# Patient Record
Sex: FEMALE | Race: ASIAN
[De-identification: names, ages, dates, MRNs, and addresses within clinical notes are randomized per-mention and may not be internally consistent; named-entity substitution may affect disease eponyms.]

---

## 2017-02-07 ENCOUNTER — HOSPITAL ENCOUNTER (EMERGENCY)
Dept: HOSPITAL 21 - SED | Age: 22
LOS: 1 days | Discharge: HOME | End: 2017-02-08
Payer: COMMERCIAL

## 2017-02-07 VITALS — OXYGEN SATURATION: 97 % | SYSTOLIC BLOOD PRESSURE: 133 MMHG | HEART RATE: 125 BPM | DIASTOLIC BLOOD PRESSURE: 78 MMHG

## 2017-02-07 VITALS
RESPIRATION RATE: 12 BRPM | OXYGEN SATURATION: 100 % | HEART RATE: 87 BPM | SYSTOLIC BLOOD PRESSURE: 152 MMHG | DIASTOLIC BLOOD PRESSURE: 97 MMHG

## 2017-02-07 VITALS — BODY MASS INDEX: 27.66 KG/M2 | WEIGHT: 150.31 LBS | HEIGHT: 62 IN

## 2017-02-07 DIAGNOSIS — R05: ICD-10-CM

## 2017-02-07 DIAGNOSIS — R50.9: ICD-10-CM

## 2017-02-07 DIAGNOSIS — R10.9: ICD-10-CM

## 2017-02-07 DIAGNOSIS — F17.200: ICD-10-CM

## 2017-02-07 DIAGNOSIS — F15.20: Primary | ICD-10-CM

## 2017-02-07 NOTE — ED.REPORT
HPI-Psychiatric Illness


Date of Service


Feb 7, 2017


 ED Provider: Bonilla Helm MD


Pt is a 22 y/o female w/ a hx of polysubstance abuse, bipolar disorder, anxiety

, PTSD, presenting to the ED via EMS for detox from meth. The pt last used 

earlier today. She denies heroin, prescription sedative, or alcohol use. She 

has vague associated complaints such as fever, cough, abdominal pain. She 

denies dysuria, diarrhea, vomiting, SI or HI. She does not feel safe where she 

lives and does not want to say why. She is a local resident.


Nursing Notes


Stated Complaint:  CONFUSION


Chief Complaint:  Psychiatric Complaint


Nursing Notes Reviewed:  Yes


Allergies:  


Coded Allergies:  


     No Known Allergies (Verified  Allergy, Unknown, 2/7/17)


No Active Prescriptions or Reported Meds





General


Time Seen by MD:  15:08





Chief Complaint


Bizarre behavior


Hx Obtained From:  Patient, EMS


Arrived By:  Ambulance


Onset Occurred:  5 - 8 hours ago


Symptom Duration:  Since onset


Location:  : Abdomen


Quality:  Aching


Severity: Current:  Mild


Severity: Maximum:  Mild





Risk-Psychiatric Illness


Suicide Risk Stratification


RF Statements:  Risk factors reviewed





Past Medical History


Past Medical History





Substance abuse - meth, cocaine, heroine


Bipolar disorder


Anxiety


PTSD





Past Surgical History





Denies





Smoking History


Current Every Day Smoker





Social History


Alcohol Use:  In recovery


Drug Use:  Cocaine, IV drugs, Meth, THC


Other Social History:  Lives with parents, Local resident





Ambulatory Status


Independent





Review of Systems


Constitutional:  Reports: Fever, 


   Denies: Chills


Respiratory:  Denies: Non-productive cough


GI:  Reports: Abdominal pain, 


   Denies: Diarrhea, Nausea, Vomiting


Psychiatric:  Reports: Change mental status, 


   Denies: Homicidal ideation, Suicidal ideation


Complete sys rev & neg:  except as marked.


 Female:  Denies: Dysuria





Physical Exam


Initial Vital Signs





 Vital Signs (First)








  Date Time  Temp Pulse Resp B/P Pulse Ox O2 Delivery O2 Flow Rate FiO2


 


2/7/17 13:27 36.8 87 12 152/97 100   


 


2/7/17 18:40      Room Air  








Initial VS:  Reviewed, Vital signs normal


    Head / Eyes:  Atraumatic, Normocephalic, PERRL


    ENT:  Mucous membranes moist, Conjunctiva normal, No scleral icterus


    Neck:  Supple, Full range of motion


    Respiratory:  Breath sounds normal, Clear to auscultation, No respiratory 

distress


    Cardiovascular:  Regular rate & rhythm, Heart sounds normal, Intact distal 

pulses


    Abdomen / GI:  Soft, Non-tender, No guarding, No rebound, No distention


    Extremities:  Vascular intact, Neuro intact, No swelling, No tenderness


    Skin:  Warm, Dry, No cyanosis


General/Constitutional:  Awake, Alert, No acute distress, Cooperative, Not 

toxic appearing


Neurologic:  Oriented X3, Speech NL, No motor deficits, No sensory deficits


Mental Status:  Positive: Confused (mild)


Psychiatric:  Not suicidal, Not homicidal


Abnormal Mood/Affect:  Positive: Apathetic, Flat affect


Abnormal Thinking / Perception:  Positive: Confused, Insight abnormal





Re-Eval/Medical Decision


Source of Hx:  Old records, EMS


Re-Evaluation/Progress :  


   Time of Eval:  23:08


   Re-Evaluation/Progress Note:  


Patient still quite confused and tangential in her thinking.  She asks to


be released and yet cannot supply no information regarding where she might


go.  I think it is distinctly unsafe to allow her to leave the building


given the subfreezing temperatures outside.  At this time we will offer


her oral Ativan to try to get her to rest through the rest of the


nighttime hours.


Consultation :  


   Consulted With:  


   Call Returned at:  16:01


   Consultant:  Will see patient, Agrees with eval, Agrees with plan


   Note:  


 will see pt and give her resources


Counseled Regarding:  Diagnosis, Lab results, Need for follow-up, When/why to 

return to ED





Discharge & Departure


Impression:  


 Primary Impression:  


 Methamphetamine abuse


Disposition:  Home


Discharge Condition


All VS Reviewed:  Yes


Condition:  Stable


Referrals:  


[facility] (PCP)


Care Transferred to:  


Rodney Black


Care Transferred at:  23:09


Enrico Attestation


Portions of this note were transcribed by Nickolas Pierre. I, Dr. Helm 

personally performed the history, physical exam and medical decision-making; I 

reviewed and confirmed the accuracy of the information in the transcribed note.





Signed by Enrico Lau, 2/7/17 - 1600


copies to:   [facility]








Bonilla Helm MD Feb 7, 2017 15:12


NICKOLAS PIERRE Feb 7, 2017 15:21

## 2017-02-08 VITALS — DIASTOLIC BLOOD PRESSURE: 74 MMHG | SYSTOLIC BLOOD PRESSURE: 122 MMHG | HEART RATE: 130 BPM

## 2017-02-08 VITALS — HEART RATE: 76 BPM | RESPIRATION RATE: 18 BRPM | SYSTOLIC BLOOD PRESSURE: 126 MMHG | DIASTOLIC BLOOD PRESSURE: 70 MMHG

## 2017-02-08 LAB
BASOPHILS % (AUTO): 0.4 % (ref 0–3)
EOSINOPHILS % (AUTO): 0.2 % (ref 0–5)
MCH RBC QN AUTO: 28.5 PG (ref 27–35)
MEAN CORPUSCULAR VOLUME: 85 FL (ref 81–100)
MONOCYTES % (AUTO): 6.1 % (ref 4–12)
NEUTROPHILS NFR BLD AUTO: 67.2 % (ref 40–74)
PLATELET COUNT: 308 BIL/L (ref 150–400)

## 2017-03-07 ENCOUNTER — HOSPITAL ENCOUNTER (EMERGENCY)
Dept: HOSPITAL 21 - SED | Age: 22
Discharge: HOME | End: 2017-03-07
Payer: COMMERCIAL

## 2017-03-07 VITALS
OXYGEN SATURATION: 99 % | HEART RATE: 113 BPM | SYSTOLIC BLOOD PRESSURE: 124 MMHG | RESPIRATION RATE: 16 BRPM | DIASTOLIC BLOOD PRESSURE: 81 MMHG

## 2017-03-07 VITALS
SYSTOLIC BLOOD PRESSURE: 124 MMHG | HEART RATE: 113 BPM | DIASTOLIC BLOOD PRESSURE: 81 MMHG | RESPIRATION RATE: 16 BRPM | OXYGEN SATURATION: 99 %

## 2017-03-07 VITALS
HEART RATE: 103 BPM | SYSTOLIC BLOOD PRESSURE: 152 MMHG | OXYGEN SATURATION: 97 % | RESPIRATION RATE: 20 BRPM | DIASTOLIC BLOOD PRESSURE: 94 MMHG

## 2017-03-07 VITALS — WEIGHT: 140.3 LBS | HEIGHT: 64 IN | BODY MASS INDEX: 23.95 KG/M2

## 2017-03-07 VITALS — RESPIRATION RATE: 17 BRPM | OXYGEN SATURATION: 98 % | HEART RATE: 126 BPM

## 2017-03-07 DIAGNOSIS — R44.1: ICD-10-CM

## 2017-03-07 DIAGNOSIS — F15.10: Primary | ICD-10-CM

## 2017-03-07 DIAGNOSIS — F31.9: ICD-10-CM

## 2017-03-07 DIAGNOSIS — F17.200: ICD-10-CM

## 2017-03-07 NOTE — ED.REPORT
HPI-Psychiatric Illness


Date of Service


Mar 7, 2017


 ED Provider: Rodney Black MD


Patient is a 21 year old female with a history of polysubstance abuse, bipolar 

disorder, and PTSD who is brought to the ED by PD after she knocked on a 

strangers house this morning, stating that she was lost. Per PD report, the 

patient stated that she had been wandering the streets since 11pm last night. 

On initial evaluation in the ED the patient states that she is hallucinating. 

She admits to using methamphetamine "1 hour ago" and knows that this is why she 

is hallucinating. The patient has previously been in inpatient treatment for 

substance abuse, at a facility in [place]. Patient states that she is trying to 

get into treatment again and that [facility] is coordinating her care. 

Patient lives with her in-laws but states she does not want them to know where 

she is. The patient's last four ED visits have all been related to 

methamphetamine abuse.


Nursing Notes


Stated Complaint:  VOLUNTARY MENTAL HEALTH EVAL


Chief Complaint:  Psychiatric Complaint


Nursing Notes Reviewed:  Yes


Allergies:  


Coded Allergies:  


     No Known Allergies (Verified  Allergy, Unknown, 2/7/17)


No Active Prescriptions or Reported Meds





General


Time Seen by MD:  05:08





Chief Complaint


Bizarre behavior


Hx Obtained From:  Patient, Police


Arrived By:  Walk-in


Onset Occurred:  5 - 8 hours ago


Symptom Duration:  Since onset


Recent Healthcare:  Recent doctor visit


Similar Sx Previous:  Yes





Risk-Psychiatric Illness


Suicide Risk Stratification


RF Statements:  Risk factors N/A





Past Medical History


Past Medical History





Substance abuse - meth, cocaine, heroine


Bipolar disorder


Anxiety


PTSD





Past Surgical History





Denies





Smoking History


Current Every Day Smoker





Social History


Alcohol Use:  In recovery


Drug Use:  Cocaine, IV drugs, Meth, THC


Other Social History:  Lives with parents, Local resident





Ambulatory Status


Independent





Review of Systems


Constitutional:  Denies: Chills, Fever


Psychiatric:  Reports: Hallucinations, visual, 


   Denies: Suicidal ideation


Complete sys rev & neg:  except as marked.





Physical Exam


Initial Vital Signs





 Vital Signs (First)








  Date Time  Temp Pulse Resp B/P Pulse Ox O2 Delivery O2 Flow Rate FiO2


 


3/7/17 03:04 35.6 126 17  98 Room Air  


 


3/7/17 03:46    152/94    








Initial VS:  Reviewed, Vital signs abnormal


    Head / Eyes:  Atraumatic, Normocephalic, PERRL


    Neck:  Supple, Full range of motion


    Extremities:  Vascular intact, Neuro intact


    Skin:  Warm, Dry, No cyanosis


General/Constitutional:  Awake, Alert, No acute distress


Neurologic:  Oriented X3, Speech NL, No motor deficits, No sensory deficits


Psychiatric:  Affect NL, Mood NL


Abnormal Thinking / Perception:  Positive: Hallucinations, visual (obviously 

tending to hallucinations)


ENT:  Airway patent





clenching teeth


Respiratory / Chest:  Breath sounds NL, Breath sounds = bilat, No respiratory 

distress, No rales, No rhonchi, No wheezing


Cardiovascular:  Heart rate NL, Regular rhythm, Heart sounds NL, No murmurs





Re-Eval/Medical Decision


Med Decision/Clinical Course


21-year-old female has a history of methamphetamine abuse.  She was found 

wandering in the cold knocking on random doors.  She was brought here for 

further evaluation and for protection.  Her evaluation was initiated by me.  

Her care is being turned over at change of shift to Dr. Randolph.


Source of Hx:  Old records


Counseled Regarding:  Diagnosis





Discharge & Departure


Shift Change Sign-Out


Patient Care Transferred:  Yes


Discussed Complaint(s):  Yes


Additonal Information:  


Awaiting MSW


Impression:  


 Primary Impression:  


 Methamphetamine abuse


 Additional Impression:  


 Visual hallucinations


Referrals:  


[facility] (PCP)


Care Transferred to:  


Dr. Randolph


Care Transferred at:  06:00


Enrico Attestation


Portions of this note were transcribed by Camille Osman. I, Dr. Black 

personally performed the history, physical exam and medical decision-making; I 

reviewed and confirmed the accuracy of the information in the transcribed note.


Signed by: Enrico Walker, 03/07/2017 0559


copies to:   [facility]








Rodney Black MD Mar 7, 2017 05:36


Camille Osman Mar 7, 2017 05:42

## 2017-06-01 ENCOUNTER — HOSPITAL ENCOUNTER (OUTPATIENT)
Dept: HOSPITAL 76 - EMS | Age: 22
Discharge: ANOTHER HOSPITAL | End: 2017-06-01
Attending: SURGERY
Payer: MEDICAID

## 2017-06-01 ENCOUNTER — HOSPITAL ENCOUNTER (EMERGENCY)
Dept: HOSPITAL 76 - ED | Age: 22
LOS: 1 days | Discharge: HOME | End: 2017-06-02
Payer: MEDICAID

## 2017-06-01 VITALS — SYSTOLIC BLOOD PRESSURE: 104 MMHG | DIASTOLIC BLOOD PRESSURE: 66 MMHG

## 2017-06-01 DIAGNOSIS — R44.0: ICD-10-CM

## 2017-06-01 DIAGNOSIS — R44.1: Primary | ICD-10-CM

## 2017-06-01 DIAGNOSIS — F15.151: Primary | ICD-10-CM

## 2017-06-01 DIAGNOSIS — F25.9: ICD-10-CM

## 2017-06-01 DIAGNOSIS — F41.9: ICD-10-CM

## 2017-06-01 DIAGNOSIS — N76.0: ICD-10-CM

## 2017-06-01 PROCEDURE — 99284 EMERGENCY DEPT VISIT MOD MDM: CPT

## 2017-06-01 PROCEDURE — 87491 CHLMYD TRACH DNA AMP PROBE: CPT

## 2017-06-01 PROCEDURE — 87591 N.GONORRHOEAE DNA AMP PROB: CPT

## 2017-06-01 PROCEDURE — 86780 TREPONEMA PALLIDUM: CPT

## 2017-06-01 PROCEDURE — 36415 COLL VENOUS BLD VENIPUNCTURE: CPT

## 2017-06-01 PROCEDURE — 84703 CHORIONIC GONADOTROPIN ASSAY: CPT

## 2017-06-01 PROCEDURE — 87389 HIV-1 AG W/HIV-1&-2 AB AG IA: CPT

## 2017-06-01 PROCEDURE — 96372 THER/PROPH/DIAG INJ SC/IM: CPT

## 2017-06-02 ENCOUNTER — HOSPITAL ENCOUNTER (EMERGENCY)
Dept: HOSPITAL 21 - SED | Age: 22
LOS: 1 days | End: 2017-06-03
Payer: COMMERCIAL

## 2017-06-02 VITALS
RESPIRATION RATE: 20 BRPM | SYSTOLIC BLOOD PRESSURE: 147 MMHG | OXYGEN SATURATION: 100 % | DIASTOLIC BLOOD PRESSURE: 96 MMHG | HEART RATE: 108 BPM

## 2017-06-02 VITALS
DIASTOLIC BLOOD PRESSURE: 85 MMHG | SYSTOLIC BLOOD PRESSURE: 125 MMHG | OXYGEN SATURATION: 100 % | HEART RATE: 120 BPM | RESPIRATION RATE: 22 BRPM

## 2017-06-02 VITALS — HEART RATE: 108 BPM | OXYGEN SATURATION: 100 %

## 2017-06-02 VITALS
RESPIRATION RATE: 20 BRPM | DIASTOLIC BLOOD PRESSURE: 86 MMHG | HEART RATE: 112 BPM | OXYGEN SATURATION: 100 % | SYSTOLIC BLOOD PRESSURE: 137 MMHG

## 2017-06-02 VITALS — HEART RATE: 124 BPM | SYSTOLIC BLOOD PRESSURE: 117 MMHG | DIASTOLIC BLOOD PRESSURE: 93 MMHG | OXYGEN SATURATION: 100 %

## 2017-06-02 DIAGNOSIS — F17.200: ICD-10-CM

## 2017-06-02 DIAGNOSIS — F31.9: ICD-10-CM

## 2017-06-02 DIAGNOSIS — F15.10: Primary | ICD-10-CM

## 2017-06-02 DIAGNOSIS — R00.0: ICD-10-CM

## 2017-06-02 LAB
BASOPHILS % (AUTO): 0.7 % (ref 0–3)
EOSINOPHILS % (AUTO): 0.7 % (ref 0–5)
MCH RBC QN AUTO: 29.7 PG (ref 27–35)
MEAN CORPUSCULAR VOLUME: 87.1 FL (ref 81–100)
MONOCYTES % (AUTO): 6.8 % (ref 4–12)
NEUTROPHILS NFR BLD AUTO: 64.8 % (ref 40–74)
PLATELET COUNT: 273 BIL/L (ref 150–400)

## 2017-06-02 PROCEDURE — 85025 COMPLETE CBC W/AUTO DIFF WBC: CPT

## 2017-06-02 PROCEDURE — 82075 ASSAY OF BREATH ETHANOL: CPT

## 2017-06-02 PROCEDURE — 99284 EMERGENCY DEPT VISIT MOD MDM: CPT

## 2017-06-02 PROCEDURE — 96372 THER/PROPH/DIAG INJ SC/IM: CPT

## 2017-06-02 PROCEDURE — 81025 URINE PREGNANCY TEST: CPT

## 2017-06-02 PROCEDURE — 80048 BASIC METABOLIC PNL TOTAL CA: CPT

## 2017-06-02 PROCEDURE — 36415 COLL VENOUS BLD VENIPUNCTURE: CPT

## 2017-06-02 NOTE — ED.REPORT
HPI-General Illness


Date of Service


Jun 2, 2017


 ED Provider: Gregory Munoz MD


The patient is a 22 year old female with a history of anxiety, PTSD, bipolar 

disorder, and substance abuse who presents to the ED reporting anxiety onset 

this morning. Associated symptoms include visual and auditory hallucinations 

described as "evil signs." The patient denies suicidal ideation, homicidal 

ideation, chest pain, SOB, headache, diarrhea, constipation, hematuria, 

hematochezia, or other symptoms. She last used methamphetamines two hours prior 

to arrival. The patient has had similar symptoms in the past.


Nursing Notes


Stated Complaint:  ANXIETY


Chief Complaint:  Psychiatric Complaint


Nursing Notes Reviewed:  Yes


Allergies:  


Coded Allergies:  


     No Known Allergies (Verified  Allergy, Unknown, 6/2/17)


No Active Prescriptions or Reported Meds





General


Time Seen by MD:  11:26





Chief Complaint


Other (Anxiety)


Hx Obtained From:  Patient


Arrived By:  Walk-in


Sudden in Onset?:  No


Onset Occurred:  1 - 4 hours ago


Context of Onset:  Amphetamine use


Symptom Duration:  Since onset


Severity: Current:  No pain currently


Severity: Maximum:  No pain


Pertinent Negative:  Relieved by nothing





Context


Related History:  Reports Drug use/abuse suspected, Reports Psychiatric history


Recent Healthcare:  No recent doctor visit


Similar Sx Previous:  Yes





Past Medical History


Past Medical History





Substance abuse - meth, cocaine, heroine


Bipolar disorder


Anxiety


PTSD


ADD





Past Surgical History





Denies





Family History





"Psychological problems" including depression





Smoking History


Current Every Day Smoker





Social History


Alcohol Use:  In recovery


Drug Use:  Cocaine, IV drugs, Meth, THC


Other Social History:  Lives with parents, Local resident





Ambulatory Status


Independent





Review of Systems


Full Review of Systems


Constitutional:  Denies: Fever


Respiratory:  Denies: Shortness of breath


Cardiovascular:  Denies: Chest pain


GI:  Denies: Constipation, Diarrhea, Hematochezia, Vomiting


 Female:  Denies: Hematuria


Neurologic:  Denies: Headache


Psychiatric:  Reports: Anxiety, Hallucinations, auditory, Hallucinations, visual

, 


   Denies: Homicidal ideation, Suicidal ideation


Complete sys rev & neg:  except as marked.





Physical Exam


Vital Signs





 Vital Signs








  Date Time  Temp Pulse Resp B/P Pulse Ox O2 Delivery O2 Flow Rate FiO2


 


6/2/17 15:21 36.1 112 20 137/86 100 Room Air  


 


6/2/17 11:21 36.1 108 20 147/96 100 Room Air  








Initial VS:  Reviewed


    Head / Eyes:  Atraumatic, Normocephalic


    ENT:  Conjunctiva normal, No scleral icterus


    Neck:  Supple, Full range of motion


    Skin:  Warm, Dry, No cyanosis


General/Constitutional:  Awake, Alert


Respiratory / Chest:  Breath sounds NL, Breath sounds = bilat, No respiratory 

distress


Cardiovascular:  Heart rate NL, Regular rhythm, Heart sounds NL


Abdomen:  Soft, Non-tender


Neurologic:  Oriented X3, Speech NL, No motor deficits, No sensory deficits


Psychiatric:  Not suicidal, Not homicidal


Abnormal Mood/Affect:  Positive: Anxious


Abnormal Thinking / Perception:  Positive: Hallucinations, auditory, 

Hallucinations, visual





Interpretation & Diagnostics


URINE DRUG SCREEN:


+ Methamphetamines


+ Amphetamines


Otherwise Negative





BREATHALYZER: 0





URINE PREGNANCY TEST: Negative





URINE DRUG SCREEN:


Bedside Urine Specific [place]


* 1.000


Bedside Urine pH


* 7


Bedside Urine Leukocyte Esterase


* Negative


Bedside Urine Nitrite


* Negative


Bedside Urine Protein


* Trace


Bedside Urine Glucose


* Normal


Bedside Urine Ketones


* + Small


Bedside Urine Urobilinogen


* Normal


Bedside Urine Bilirubin


* Negative


Bedside Urine Occult Blood


* Negative


Urine to Lab 


* Yes


Lab Results Interpretation











Test


  6/2/17


11:35


 


Hold Urine


  Received


(Received)











Re-Eval/Medical Decision


Med Decision/Clinical Course





22-year-old female here for evaluation of anxiety, hallucinations in the


setting of known substance abuse and methamphetamine use shortly prior to


arrival.  Observed in the ED for several hours with only minimal


improvement in symptoms.  After discussion with the , the


patient will be observed overnight to be reassessed when sober. Signed out


to Dr. Love at 1800.


Source of Hx:  Old records





   Time of Eval:  16:52


   Patient Status:  Condition improved


   Re-Evaluation/Progress Note:  


Patient feels better but is still actively hallucinating.








   Time of Eval:  18:03


   Patient Status:  Condition improved


   Re-Evaluation/Progress Note:  


Patient is feeling better and requests social work consult. Discussed with


patient lab results, diagnosis, and plan for transfer of care to Dr. Love


at change of shift.


Consultation :  


   Consulted With:  


   Call Returned at:  18:14


   Consultant:  Will see patient, Agrees with eval, Agrees with plan


Counseled Regarding:  Diagnosis, Lab results





Discharge & Departure


Shift Change Sign-Out


Patient Care Transferred:  Yes (Dr. Love)


Discussed Complaint(s):  Yes


Laboratory Evaluation:  Lab evaluation discussed


Response to Therapy:  Improved





 Primary Impression:  


 Methamphetamine abuse


 Additional Impression:  


 Tachycardia


Referrals:  


[facility] (PCP)


Care Transferred to:  


Dr. Love


Care Transferred at:  18:20


Scribe Attestation


Portions of this note were transcribed by China Curtis. I, Dr. Munoz, 

personally performed the history, physical exam, and medical decision-making; I 

reviewed and confirmed the accuracy of the information in the transcribed note.





Signed by: Enrico Martínez, 6/2/2017, 18:20


copies to:   [facility]








Gregory Munoz MD Jun 2, 2017 11:29


CHINA CURTIS Jun 2, 2017 12:04

## 2017-06-03 VITALS
SYSTOLIC BLOOD PRESSURE: 104 MMHG | HEART RATE: 94 BPM | DIASTOLIC BLOOD PRESSURE: 64 MMHG | RESPIRATION RATE: 16 BRPM | OXYGEN SATURATION: 99 %

## 2017-06-04 LAB — T PALLIDUM AB SER QL IA: NEGATIVE

## 2020-12-18 ENCOUNTER — HOSPITAL ENCOUNTER (EMERGENCY)
Dept: HOSPITAL 76 - ED | Age: 25
LOS: 1 days | Discharge: PSYCHIATRIC OR SUBSTANCE USE FACILITY | End: 2020-12-19
Payer: MEDICAID

## 2020-12-18 ENCOUNTER — HOSPITAL ENCOUNTER (OUTPATIENT)
Dept: HOSPITAL 76 - EMS | Age: 25
Discharge: ANOTHER HOSPITAL | End: 2020-12-18
Attending: SURGERY
Payer: MEDICAID

## 2020-12-18 DIAGNOSIS — Z20.828: ICD-10-CM

## 2020-12-18 DIAGNOSIS — F15.10: Primary | ICD-10-CM

## 2020-12-18 DIAGNOSIS — F90.9: ICD-10-CM

## 2020-12-18 DIAGNOSIS — R41.0: ICD-10-CM

## 2020-12-18 DIAGNOSIS — F25.0: ICD-10-CM

## 2020-12-18 DIAGNOSIS — Z04.6: Primary | ICD-10-CM

## 2020-12-18 LAB
ALBUMIN DIAFP-MCNC: 5.3 G/DL (ref 3.2–5.5)
ALBUMIN/GLOB SERPL: 1.4 {RATIO} (ref 1–2.2)
ALP SERPL-CCNC: 38 IU/L (ref 42–121)
ALT SERPL W P-5'-P-CCNC: 20 IU/L (ref 10–60)
AMPHET UR QL SCN: POSITIVE
ANION GAP SERPL CALCULATED.4IONS-SCNC: 13 MMOL/L (ref 6–13)
APAP SERPL-MCNC: < 10 UG/ML (ref 10–30)
AST SERPL W P-5'-P-CCNC: 18 IU/L (ref 10–42)
BASOPHILS NFR BLD AUTO: 0.1 10^3/UL (ref 0–0.1)
BASOPHILS NFR BLD AUTO: 0.6 %
BENZODIAZ UR QL SCN: NEGATIVE
BILIRUB BLD-MCNC: 1.3 MG/DL (ref 0.2–1)
BUN SERPL-MCNC: 9 MG/DL (ref 6–20)
CALCIUM UR-MCNC: 9.8 MG/DL (ref 8.5–10.3)
CHLORIDE SERPL-SCNC: 100 MMOL/L (ref 101–111)
CLARITY UR REFRACT.AUTO: (no result)
CO2 SERPL-SCNC: 24 MMOL/L (ref 21–32)
COCAINE UR-SCNC: NEGATIVE UMOL/L
CREAT SERPLBLD-SCNC: 0.6 MG/DL (ref 0.4–1)
EOSINOPHIL # BLD AUTO: 0 10^3/UL (ref 0–0.7)
EOSINOPHIL NFR BLD AUTO: 0.3 %
ERYTHROCYTE [DISTWIDTH] IN BLOOD BY AUTOMATED COUNT: 12.1 % (ref 12–15)
GLOBULIN SER-MCNC: 3.7 G/DL (ref 2.1–4.2)
GLUCOSE SERPL-MCNC: 119 MG/DL (ref 70–100)
GLUCOSE UR QL STRIP.AUTO: NEGATIVE MG/DL
HCG UR QL: NEGATIVE
HGB UR QL STRIP: 15.8 G/DL (ref 12–16)
KETONES UR QL STRIP.AUTO: 15 MG/DL
LIPASE SERPL-CCNC: 27 U/L (ref 22–51)
LYMPHOCYTES # SPEC AUTO: 2.4 10^3/UL (ref 1.5–3.5)
LYMPHOCYTES NFR BLD AUTO: 21 %
MCH RBC QN AUTO: 29 PG (ref 27–31)
MCHC RBC AUTO-ENTMCNC: 31.9 G/DL (ref 32–36)
MCV RBC AUTO: 91.2 FL (ref 81–99)
METHADONE UR QL SCN: NEGATIVE
METHAMPHET UR QL SCN: POSITIVE
MONOCYTES # BLD AUTO: 0.6 10^3/UL (ref 0–1)
MONOCYTES NFR BLD AUTO: 5.4 %
MUCOUS THREADS URNS QL MICRO: (no result)
NEUTROPHILS # BLD AUTO: 8.3 10^3/UL (ref 1.5–6.6)
NEUTROPHILS # SNV AUTO: 11.4 X10^3/UL (ref 4.8–10.8)
NEUTROPHILS NFR BLD AUTO: 72.5 %
NITRITE UR QL STRIP.AUTO: NEGATIVE
OPIATES UR QL SCN: NEGATIVE
PDW BLD AUTO: 9.8 FL (ref 7.9–10.8)
PH UR STRIP.AUTO: 6 PH (ref 5–7.5)
PLATELET # BLD: 330 10^3/UL (ref 130–450)
PROT SPEC-MCNC: 9 G/DL (ref 6.7–8.2)
PROT UR STRIP.AUTO-MCNC: NEGATIVE MG/DL
RBC # UR STRIP.AUTO: (no result) /UL
RBC # URNS HPF: (no result) /HPF (ref 0–5)
RBC MAR: 5.44 10^6/UL (ref 4.2–5.4)
SALICYLATES SERPL-MCNC: < 6 MG/DL
SODIUM SERPLBLD-SCNC: 137 MMOL/L (ref 135–145)
SP GR UR STRIP.AUTO: 1.02 (ref 1–1.03)
SQUAMOUS URNS QL MICRO: (no result)
UROBILINOGEN UR QL STRIP.AUTO: (no result) E.U./DL
UROBILINOGEN UR STRIP.AUTO-MCNC: NEGATIVE MG/DL
VOLATILE DRUGS POS SERPL SCN: (no result)

## 2020-12-18 PROCEDURE — 85025 COMPLETE CBC W/AUTO DIFF WBC: CPT

## 2020-12-18 PROCEDURE — 80306 DRUG TEST PRSMV INSTRMNT: CPT

## 2020-12-18 PROCEDURE — 84443 ASSAY THYROID STIM HORMONE: CPT

## 2020-12-18 PROCEDURE — 80320 DRUG SCREEN QUANTALCOHOLS: CPT

## 2020-12-18 PROCEDURE — 0202U NFCT DS 22 TRGT SARS-COV-2: CPT

## 2020-12-18 PROCEDURE — 80307 DRUG TEST PRSMV CHEM ANLYZR: CPT

## 2020-12-18 PROCEDURE — 81025 URINE PREGNANCY TEST: CPT

## 2020-12-18 PROCEDURE — 99285 EMERGENCY DEPT VISIT HI MDM: CPT

## 2020-12-18 PROCEDURE — 81001 URINALYSIS AUTO W/SCOPE: CPT

## 2020-12-18 PROCEDURE — 87086 URINE CULTURE/COLONY COUNT: CPT

## 2020-12-18 PROCEDURE — 80329 ANALGESICS NON-OPIOID 1 OR 2: CPT

## 2020-12-18 PROCEDURE — 80053 COMPREHEN METABOLIC PANEL: CPT

## 2020-12-18 PROCEDURE — 36415 COLL VENOUS BLD VENIPUNCTURE: CPT

## 2020-12-18 PROCEDURE — 99281 EMR DPT VST MAYX REQ PHY/QHP: CPT

## 2020-12-18 PROCEDURE — 83690 ASSAY OF LIPASE: CPT

## 2020-12-18 PROCEDURE — 81003 URINALYSIS AUTO W/O SCOPE: CPT

## 2020-12-19 VITALS — DIASTOLIC BLOOD PRESSURE: 75 MMHG | SYSTOLIC BLOOD PRESSURE: 120 MMHG

## 2020-12-19 LAB — C PNEUM DNA NPH QL NAA+NON-PROBE: NOT DETECTED

## 2021-02-10 ENCOUNTER — HOSPITAL ENCOUNTER (OUTPATIENT)
Dept: HOSPITAL 76 - EMS | Age: 26
Discharge: ANOTHER HOSPITAL | End: 2021-02-10
Attending: EMERGENCY MEDICINE
Payer: MEDICAID

## 2021-02-10 ENCOUNTER — HOSPITAL ENCOUNTER (EMERGENCY)
Dept: HOSPITAL 76 - ED | Age: 26
Discharge: HOME | End: 2021-02-10
Payer: MEDICAID

## 2021-02-10 VITALS — SYSTOLIC BLOOD PRESSURE: 100 MMHG | DIASTOLIC BLOOD PRESSURE: 60 MMHG

## 2021-02-10 DIAGNOSIS — Z78.1: ICD-10-CM

## 2021-02-10 DIAGNOSIS — F15.151: Primary | ICD-10-CM

## 2021-02-10 DIAGNOSIS — F25.0: ICD-10-CM

## 2021-02-10 DIAGNOSIS — Z04.6: Primary | ICD-10-CM

## 2021-02-10 DIAGNOSIS — F41.9: ICD-10-CM

## 2021-02-10 LAB
ALBUMIN DIAFP-MCNC: 4.6 G/DL (ref 3.2–5.5)
ALBUMIN/GLOB SERPL: 1.4 {RATIO} (ref 1–2.2)
ALP SERPL-CCNC: 42 IU/L (ref 42–121)
ALT SERPL W P-5'-P-CCNC: 33 IU/L (ref 10–60)
AMPHET UR QL SCN: POSITIVE
ANION GAP SERPL CALCULATED.4IONS-SCNC: 13 MMOL/L (ref 6–13)
APAP SERPL-MCNC: < 10 UG/ML (ref 10–30)
AST SERPL W P-5'-P-CCNC: 17 IU/L (ref 10–42)
BASOPHILS NFR BLD AUTO: 0.1 10^3/UL (ref 0–0.1)
BASOPHILS NFR BLD AUTO: 0.4 %
BENZODIAZ UR QL SCN: POSITIVE
BILIRUB BLD-MCNC: 0.5 MG/DL (ref 0.2–1)
BUN SERPL-MCNC: 12 MG/DL (ref 6–20)
CALCIUM UR-MCNC: 9.6 MG/DL (ref 8.5–10.3)
CHLORIDE SERPL-SCNC: 104 MMOL/L (ref 101–111)
CLARITY UR REFRACT.AUTO: CLEAR
CO2 SERPL-SCNC: 23 MMOL/L (ref 21–32)
COCAINE UR-SCNC: NEGATIVE UMOL/L
CREAT SERPLBLD-SCNC: 0.5 MG/DL (ref 0.4–1)
EOSINOPHIL # BLD AUTO: 0 10^3/UL (ref 0–0.7)
EOSINOPHIL NFR BLD AUTO: 0.2 %
ERYTHROCYTE [DISTWIDTH] IN BLOOD BY AUTOMATED COUNT: 12.3 % (ref 12–15)
GLOBULIN SER-MCNC: 3.2 G/DL (ref 2.1–4.2)
GLUCOSE SERPL-MCNC: 98 MG/DL (ref 70–100)
GLUCOSE UR QL STRIP.AUTO: NEGATIVE MG/DL
HCG UR QL: NEGATIVE
HGB UR QL STRIP: 13.7 G/DL (ref 12–16)
KETONES UR QL STRIP.AUTO: NEGATIVE MG/DL
LIPASE SERPL-CCNC: 36 U/L (ref 22–51)
LYMPHOCYTES # SPEC AUTO: 1.4 10^3/UL (ref 1.5–3.5)
LYMPHOCYTES NFR BLD AUTO: 9.6 %
MCH RBC QN AUTO: 30.2 PG (ref 27–31)
MCHC RBC AUTO-ENTMCNC: 32.6 G/DL (ref 32–36)
MCV RBC AUTO: 92.7 FL (ref 81–99)
METHADONE UR QL SCN: NEGATIVE
METHAMPHET UR QL SCN: POSITIVE
MONOCYTES # BLD AUTO: 0.6 10^3/UL (ref 0–1)
MONOCYTES NFR BLD AUTO: 4.5 %
NEUTROPHILS # BLD AUTO: 12 10^3/UL (ref 1.5–6.6)
NEUTROPHILS # SNV AUTO: 14.1 X10^3/UL (ref 4.8–10.8)
NEUTROPHILS NFR BLD AUTO: 84.7 %
NITRITE UR QL STRIP.AUTO: NEGATIVE
OPIATES UR QL SCN: NEGATIVE
PDW BLD AUTO: 9.6 FL (ref 7.9–10.8)
PH UR STRIP.AUTO: 6 PH (ref 5–7.5)
PLATELET # BLD: 280 10^3/UL (ref 130–450)
PROT SPEC-MCNC: 7.8 G/DL (ref 6.7–8.2)
PROT UR STRIP.AUTO-MCNC: NEGATIVE MG/DL
RBC # UR STRIP.AUTO: NEGATIVE /UL
RBC MAR: 4.53 10^6/UL (ref 4.2–5.4)
SALICYLATES SERPL-MCNC: < 6 MG/DL
SP GR UR STRIP.AUTO: 1.02 (ref 1–1.03)
UROBILINOGEN UR QL STRIP.AUTO: (no result) E.U./DL
UROBILINOGEN UR STRIP.AUTO-MCNC: NEGATIVE MG/DL
VOLATILE DRUGS POS SERPL SCN: (no result)

## 2021-02-10 PROCEDURE — 83690 ASSAY OF LIPASE: CPT

## 2021-02-10 PROCEDURE — 36415 COLL VENOUS BLD VENIPUNCTURE: CPT

## 2021-02-10 PROCEDURE — 96372 THER/PROPH/DIAG INJ SC/IM: CPT

## 2021-02-10 PROCEDURE — 81025 URINE PREGNANCY TEST: CPT

## 2021-02-10 PROCEDURE — 80306 DRUG TEST PRSMV INSTRMNT: CPT

## 2021-02-10 PROCEDURE — 81003 URINALYSIS AUTO W/O SCOPE: CPT

## 2021-02-10 PROCEDURE — 99285 EMERGENCY DEPT VISIT HI MDM: CPT

## 2021-02-10 PROCEDURE — 84443 ASSAY THYROID STIM HORMONE: CPT

## 2021-02-10 PROCEDURE — 80320 DRUG SCREEN QUANTALCOHOLS: CPT

## 2021-02-10 PROCEDURE — 80329 ANALGESICS NON-OPIOID 1 OR 2: CPT

## 2021-02-10 PROCEDURE — 99283 EMERGENCY DEPT VISIT LOW MDM: CPT

## 2021-02-10 PROCEDURE — 87086 URINE CULTURE/COLONY COUNT: CPT

## 2021-02-10 PROCEDURE — 85025 COMPLETE CBC W/AUTO DIFF WBC: CPT

## 2021-02-10 PROCEDURE — 80307 DRUG TEST PRSMV CHEM ANLYZR: CPT

## 2021-02-10 PROCEDURE — 81001 URINALYSIS AUTO W/SCOPE: CPT

## 2021-02-10 PROCEDURE — 80053 COMPREHEN METABOLIC PANEL: CPT

## 2024-01-27 ENCOUNTER — HOSPITAL ENCOUNTER (EMERGENCY)
Dept: HOSPITAL 76 - ED | Age: 29
Discharge: HOME | End: 2024-01-27
Payer: MEDICAID

## 2024-01-27 VITALS — DIASTOLIC BLOOD PRESSURE: 72 MMHG | OXYGEN SATURATION: 98 % | SYSTOLIC BLOOD PRESSURE: 120 MMHG

## 2024-01-27 DIAGNOSIS — F17.200: ICD-10-CM

## 2024-01-27 DIAGNOSIS — F31.9: ICD-10-CM

## 2024-01-27 DIAGNOSIS — Z02.89: Primary | ICD-10-CM

## 2024-01-27 PROCEDURE — 99283 EMERGENCY DEPT VISIT LOW MDM: CPT

## 2024-01-27 PROCEDURE — 99282 EMERGENCY DEPT VISIT SF MDM: CPT
